# Patient Record
Sex: MALE | Race: OTHER | URBAN - METROPOLITAN AREA
[De-identification: names, ages, dates, MRNs, and addresses within clinical notes are randomized per-mention and may not be internally consistent; named-entity substitution may affect disease eponyms.]

---

## 2020-02-15 ENCOUNTER — EMERGENCY (EMERGENCY)
Facility: HOSPITAL | Age: 45
LOS: 1 days | Discharge: HOME | End: 2020-02-15
Admitting: EMERGENCY MEDICINE
Payer: OTHER MISCELLANEOUS

## 2020-02-15 VITALS
HEIGHT: 76 IN | WEIGHT: 197.98 LBS | OXYGEN SATURATION: 98 % | DIASTOLIC BLOOD PRESSURE: 82 MMHG | SYSTOLIC BLOOD PRESSURE: 124 MMHG | TEMPERATURE: 99 F | RESPIRATION RATE: 18 BRPM | HEART RATE: 81 BPM

## 2020-02-15 RX ORDER — OXYCODONE AND ACETAMINOPHEN 5; 325 MG/1; MG/1
1 TABLET ORAL ONCE
Refills: 0 | Status: DISCONTINUED | OUTPATIENT
Start: 2020-02-15 | End: 2020-02-15

## 2020-02-15 RX ORDER — OXYCODONE HYDROCHLORIDE 5 MG/1
1 TABLET ORAL
Qty: 10 | Refills: 0
Start: 2020-02-15

## 2020-02-15 RX ADMIN — OXYCODONE AND ACETAMINOPHEN 1 TABLET(S): 5; 325 TABLET ORAL at 14:10

## 2020-02-15 NOTE — ED ADULT NURSE NOTE - OBJECTIVE STATEMENT
45 y/o male came in to ED for evaluation of bilateral hand pain after having heavy plastic boxes fall on hands at work today. Pt noted to have abrasion to left hand. unknown last tetanus vaccine. denies any numbness/tingling to upper extremities, use of blood thinners. <2 sec cap refill and +2 radial pulse noted to b.l upper extremities.

## 2020-02-15 NOTE — ED ADULT TRIAGE NOTE - CHIEF COMPLAINT QUOTE
43 y/o male came in to ED for evaluation of bilateral hand pain s/p box fell on hands at work. Pt noted to have abrasion to left hand.

## 2020-02-15 NOTE — ED PROVIDER NOTE - NSFOLLOWUPINSTRUCTIONS_ED_ALL_ED_FT
Metacarpal Fracture     A metacarpal fracture is a break (fracture) of a bone in the hand. Metacarpals are the bones that go from your knuckles to your wrist. You have five metacarpal bones in each hand. This fracture is usually caused by a fall or an injury that crushes the hand.  This injury is diagnosed with medical history, a physical exam, or imaging tests, such as an X-ray.  What are the signs or symptoms?  Symptoms of this condition may include:  Pain.Swelling.Stiffness.Bruising.Inability to move a finger.A finger that looks misshapen.An abnormal bend or bump in the hand or finger (deformity).How is this treated?  Treatment depends on how bad the injury is.  If your broken bone is still in place and did not move, you may need:  To wear a splint or cast for several weeks.To have the broken finger taped to another finger next to it (horacio taping).If the broken bone has pieces that moved and no longer line up, your doctor may:  Do surgery to fix the bones into place with metal screws, plates, or wires.Move the bones back into position without surgery (closed reduction).After your bones are put together, you will need to wear a splint or cast for several weeks.Treatment may also include:  Physical therapy after your cast or splint is removed.Follow-up visits and X-rays to make sure you are healing.Follow these instructions at home:  If you have a splint:     Wear the splint as told by your doctor. Remove it only as told by your doctor.Loosen the splint if your fingers or toes tingle, become numb, or turn cold and blue. Keep the splint clean. If the splint is not waterproof:   Do not let it get wet. Cover it with a watertight covering when you take a bath or a shower.If you have a cast:     Do not stick anything inside the cast to scratch your skin.Check the skin around the cast every day. Tell your doctor about any concerns.You may put lotion on dry skin around the edges of the cast. Do not put lotion on the skin underneath the cast. Keep the cast clean. If the cast is not waterproof:   Do not let it get wet.Cover it with a watertight covering when you take a bath or a shower.Activity     Do not lift or hold anything with your injured hand.Return to your normal activities as told by your doctor. Ask your doctor what activities are safe for you.Do exercises as told by your doctor.Driving     Do not drive or use heavy machinery while taking pain medicine.Do not drive while wearing a cast or splint on a hand that you use for driving.Managing pain, stiffness, and swelling     If told, put ice on the injured area. Put ice only if you have a splint, not a cast.  If you can remove your splint, remove it as told by your doctor.Put ice in a plastic bag.Place a towel between your skin and the bag.Leave the ice on for 20 minutes, 2–3 times a day.Move your fingers often. This helps to prevent stiffness and swelling.Raise the injured area above the level of your heart while you are sitting or lying down.General instructions     Do not put pressure on any part of the cast or splint until it is fully hardened. This may take several hours.Do not use any products that contain nicotine or tobacco, such as cigarettes and e-cigarettes. These can delay bone healing. If you need help quitting, ask your doctor.Do not take baths, swim, or use a hot tub until your doctor says it is okay. Ask your doctor if you may take showers. You may only be allowed to take sponge baths.Take over-the-counter and prescription medicines only as told by your doctor.Keep all follow-up visits as told by your doctor. This is important.Contact a doctor if:  Your pain is worse.You have redness, swelling, or pain that gets worse.You have a fever.There is a bad smell coming from your cast or splint.Get help right away if:  You have very bad pain under the cast or in your hand.You have trouble breathing.The following happen, even after you loosen your splint:  Your hand or fingernails turn blue or gray.Your hand feels cold or numb.Summary  A metacarpal fracture is a break (fracture) of a bone in the hand.Treatment depends on how bad the injury is. You may need a cast or splint for a broken bone that did not move. You may need surgery for a very bad injury that moved the pieces of bone in your hand.Follow your doctor's instructions for taking care of your injury after treatment.This information is not intended to replace advice given to you by your health care provider. Make sure you discuss any questions you have with your health care provider.

## 2020-02-15 NOTE — ED PROVIDER NOTE - CARE PROVIDER_API CALL
Florida Lawson)  Plastic Surgery; Surgery  [address] [place] [address]  Phone: [phone]  Fax: [phone]  Follow Up Time:

## 2020-02-15 NOTE — ED PROVIDER NOTE - CLINICAL SUMMARY MEDICAL DECISION MAKING FREE TEXT BOX
46 y/o M presents today complaining of b/l hand pain, L worsen then R after a heavy object fell on his hand. 44 y/o M presents today complaining of b/l hand pain, L worsen then R after a heavy object fell on his hand. On exam, R hand shows minimal swelling, sensation grossly intact, L hand exam shows swelling and tenderness in ulnar of dorsum on L hand, unable to flex 4th and 5th digits. Plan for b/l hand X ray and reassess. 46 y/o M presents today complaining of b/l hand pain, L worsen then R after a heavy object fell on his hand. On exam, R hand shows minimal swelling, sensation grossly intact, L hand exam shows swelling and tenderness in ulnar of dorsum on L hand, unable to flex 4th and 5th digits. Plan for b/l hand X ray and reassess. Pt given Percocet.

## 2020-02-15 NOTE — ED PROVIDER NOTE - MUSCULOSKELETAL, MLM
R hand exam shows minimal swelling in the dorsum hand to other aspect, skin intact, able to make fist but with discomfort, sensation grossly intact distally. L hand exam shows swelling and tenderness in all aspects of dorsum on L hand, unable to flex 4th and 5th digits, small abrasion proximal to 3rd MCPJ. R hand exam shows minimal swelling in the dorsum hand to ulnar aspect, skin intact, able to make fist but with discomfort, sensation grossly intact distally. L hand exam shows swelling and tenderness in ulnar of dorsum on L hand, unable to flex 4th and 5th digits, small abrasion proximal to 3rd MCPJ.

## 2020-02-15 NOTE — ED PROVIDER NOTE - PATIENT PORTAL LINK FT
You can access the FollowMyHealth Patient Portal offered by [facility] by registering at the following website: [email or web address]. By joining [facility]’s FollowMyHealth portal, you will also be able to view your health information using other applications (apps) compatible with our system.

## 2020-02-15 NOTE — ED PROVIDER NOTE - OBJECTIVE STATEMENT
46 y/o M presenting ED complaining of b/l hand pain after a heavy object fell on his hands earlier today, L hand pain more intense than R hand, no other injury except a small skin scrap on L hand from the heavy object. Pt reports he is unable to make a full fist with his L hand. Last tetanus shot unknown. 46 y/o M presenting ED complaining of b/l hand pain after a heavy object fell on his hands earlier today, L hand pain more intense than R hand, no other injury except a small skin scrap on L hand from the heavy object. Pt reports he is unable to make a full fist with his L hand. Pt reports a L 5th digit fracture 20 years ago. Last tetanus shot unknown.

## 2020-02-24 ENCOUNTER — OUTPATIENT (OUTPATIENT)
Dept: OUTPATIENT SERVICES | Facility: HOSPITAL | Age: 45
LOS: 1 days | End: 2020-02-24
Payer: OTHER MISCELLANEOUS

## 2020-02-24 DIAGNOSIS — Z00.00 ENCOUNTER FOR GENERAL ADULT MEDICAL EXAMINATION WITHOUT ABNORMAL FINDINGS: ICD-10-CM

## 2020-02-24 LAB
ANION GAP SERPL CALC-SCNC: 12 MMOL/L — SIGNIFICANT CHANGE UP (ref 5–17)
APTT BLD: 26.6 SEC — LOW (ref 27.5–36.3)
BUN SERPL-MCNC: 13 MG/DL — SIGNIFICANT CHANGE UP (ref 7–23)
CALCIUM SERPL-MCNC: 9.3 MG/DL — SIGNIFICANT CHANGE UP (ref 8.4–10.5)
CHLORIDE SERPL-SCNC: 103 MMOL/L — SIGNIFICANT CHANGE UP (ref 96–108)
CO2 SERPL-SCNC: 24 MMOL/L — SIGNIFICANT CHANGE UP (ref 22–31)
CREAT SERPL-MCNC: 0.88 MG/DL — SIGNIFICANT CHANGE UP (ref 0.5–1.3)
GLUCOSE SERPL-MCNC: 112 MG/DL — HIGH (ref 70–99)
HCT VFR BLD CALC: 47.4 % — SIGNIFICANT CHANGE UP (ref 39–50)
HGB BLD-MCNC: 15.4 G/DL — SIGNIFICANT CHANGE UP (ref 13–17)
INR BLD: 0.92 — SIGNIFICANT CHANGE UP (ref 0.88–1.16)
MCHC RBC-ENTMCNC: 30.1 PG — SIGNIFICANT CHANGE UP (ref 27–34)
MCHC RBC-ENTMCNC: 32.5 GM/DL — SIGNIFICANT CHANGE UP (ref 32–36)
MCV RBC AUTO: 92.8 FL — SIGNIFICANT CHANGE UP (ref 80–100)
NRBC # BLD: 0 /100 WBCS — SIGNIFICANT CHANGE UP (ref 0–0)
PLATELET # BLD AUTO: 256 K/UL — SIGNIFICANT CHANGE UP (ref 150–400)
POTASSIUM SERPL-MCNC: 4 MMOL/L — SIGNIFICANT CHANGE UP (ref 3.5–5.3)
POTASSIUM SERPL-SCNC: 4 MMOL/L — SIGNIFICANT CHANGE UP (ref 3.5–5.3)
PROTHROM AB SERPL-ACNC: 10.5 SEC — SIGNIFICANT CHANGE UP (ref 10–12.9)
RBC # BLD: 5.11 M/UL — SIGNIFICANT CHANGE UP (ref 4.2–5.8)
RBC # FLD: 12.1 % — SIGNIFICANT CHANGE UP (ref 10.3–14.5)
SODIUM SERPL-SCNC: 139 MMOL/L — SIGNIFICANT CHANGE UP (ref 135–145)
WBC # BLD: 5.73 K/UL — SIGNIFICANT CHANGE UP (ref 3.8–10.5)
WBC # FLD AUTO: 5.73 K/UL — SIGNIFICANT CHANGE UP (ref 3.8–10.5)

## 2020-02-26 ENCOUNTER — OUTPATIENT (OUTPATIENT)
Dept: OUTPATIENT SERVICES | Facility: HOSPITAL | Age: 45
LOS: 1 days | Discharge: HOME | End: 2020-02-26
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
## 2020-02-29 DIAGNOSIS — S62.337A DISPLACED FRACTURE OF NECK OF FIFTH METACARPAL BONE, LEFT HAND, INITIAL ENCOUNTER FOR CLOSED FRACTURE: ICD-10-CM

## 2020-02-29 DIAGNOSIS — M79.641 PAIN IN RIGHT HAND: ICD-10-CM

## 2020-02-29 DIAGNOSIS — S62.394A OTHER FRACTURE OF FOURTH METACARPAL BONE, RIGHT HAND, INITIAL ENCOUNTER FOR CLOSED FRACTURE: ICD-10-CM
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
## 2024-04-27 PROBLEM — Z78.9 OTHER SPECIFIED HEALTH STATUS: Chronic | Status: ACTIVE | Noted: 2020-02-15
